# Patient Record
Sex: FEMALE | NOT HISPANIC OR LATINO | URBAN - METROPOLITAN AREA
[De-identification: names, ages, dates, MRNs, and addresses within clinical notes are randomized per-mention and may not be internally consistent; named-entity substitution may affect disease eponyms.]

---

## 2017-01-16 ENCOUNTER — IMPORTED ENCOUNTER (OUTPATIENT)
Dept: URBAN - METROPOLITAN AREA CLINIC 1 | Facility: CLINIC | Age: 82
End: 2017-01-16

## 2017-01-16 PROBLEM — Z96.1: Noted: 2017-01-16

## 2017-01-16 PROBLEM — H53.2: Noted: 2017-01-16

## 2017-01-16 PROBLEM — H04.123: Noted: 2017-01-16

## 2017-01-16 PROBLEM — H40.023: Noted: 2017-01-16

## 2017-01-16 PROBLEM — H16.143: Noted: 2017-01-16

## 2017-01-16 PROBLEM — H18.50: Noted: 2017-01-16

## 2017-01-16 PROBLEM — H17.13: Noted: 2017-01-16

## 2017-01-16 PROCEDURE — 92012 INTRM OPH EXAM EST PATIENT: CPT

## 2017-04-25 ENCOUNTER — IMPORTED ENCOUNTER (OUTPATIENT)
Dept: URBAN - METROPOLITAN AREA CLINIC 1 | Facility: CLINIC | Age: 82
End: 2017-04-25

## 2017-04-25 PROBLEM — H18.50: Noted: 2017-04-25

## 2017-04-25 PROBLEM — H04.123: Noted: 2017-04-25

## 2017-04-25 PROBLEM — H16.143: Noted: 2017-04-25

## 2017-04-25 PROBLEM — H40.023: Noted: 2017-04-25

## 2017-04-25 PROBLEM — Z96.1: Noted: 2017-04-25

## 2017-04-25 PROBLEM — H43.813: Noted: 2017-04-25

## 2017-04-25 PROCEDURE — 92014 COMPRE OPH EXAM EST PT 1/>: CPT

## 2017-04-25 NOTE — PATIENT DISCUSSION
1.  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 Continue Suzanne 128 gtts OU QHS. Reassurance given. 2.  LAILA w/ PEK OU- Continue ATs Q2hrs OU w/a. 3.  Glaucoma Suspect OU (0.8 OU/ () FHX): Stable IOP OU. Past w/u normal OU. Patient is considered high risk. 4.  Pseudophakia OU- (Standard OU) 5. PVD OU- stable RD precautions Letter to [address] for an appointment in 6 mo 10 OCT with Dr. Mariza Friedman.

## 2017-08-01 ENCOUNTER — IMPORTED ENCOUNTER (OUTPATIENT)
Dept: URBAN - METROPOLITAN AREA CLINIC 1 | Facility: CLINIC | Age: 82
End: 2017-08-01

## 2017-08-01 PROBLEM — Z96.1: Noted: 2017-08-01

## 2017-08-01 PROBLEM — H04.123: Noted: 2017-08-01

## 2017-08-01 PROBLEM — H18.50: Noted: 2017-08-01

## 2017-08-01 PROBLEM — H40.023: Noted: 2017-08-01

## 2017-08-01 PROBLEM — H16.143: Noted: 2017-08-01

## 2017-08-01 PROCEDURE — 92012 INTRM OPH EXAM EST PATIENT: CPT

## 2017-08-01 NOTE — PATIENT DISCUSSION
1.  Eczematous Dermatitis UL and LL OU - Begin Maxitrol alexandra QHS UL and LL x 5 days then DC and may use as needed 2. Corneal Dystrophy OU- w/ secondary Corneal Scars OU. observe3. LAILA w/ PEK OU-The use/continuation of artificial tears were recommended. 4.  Pseudophakia OU - h/o YAG Cap OU 5.  Glaucoma Suspect OU 0.8 OU/ () FHX): Patient is considered high risk. Condition was discussed with patient and patient understands. Will continue to monitor patient for any progression in condition. Patient was advised to call us with any problems questions or concerns. 6.  H/o PVD OUReturn for an appointment for Return as scheduled with Dr. Griselda Giron.

## 2017-10-23 ENCOUNTER — IMPORTED ENCOUNTER (OUTPATIENT)
Dept: URBAN - METROPOLITAN AREA CLINIC 1 | Facility: CLINIC | Age: 82
End: 2017-10-23

## 2017-10-23 PROBLEM — Z96.1: Noted: 2017-10-23

## 2017-10-23 PROBLEM — H16.143: Noted: 2017-10-23

## 2017-10-23 PROBLEM — H04.123: Noted: 2017-10-23

## 2017-10-23 PROBLEM — H18.50: Noted: 2017-10-23

## 2017-10-23 PROBLEM — H40.013: Noted: 2017-10-23

## 2017-10-23 PROCEDURE — 92012 INTRM OPH EXAM EST PATIENT: CPT

## 2017-10-23 PROCEDURE — 92133 CPTRZD OPH DX IMG PST SGM ON: CPT

## 2017-10-23 NOTE — PATIENT DISCUSSION
1.  LAILA w/ PEK OU- Stable. The continuation of artificial tears were recommended. 2.  Glaucoma Suspect OU (0.8 OU/ () FHX): Stable IOP OU. Normal OCT OU. Patient is considered Low Risk. Condition was discussed with patient and patient understands. Will continue to monitor patient for any progression in condition. Patient was advised to call us with any problems questions or concerns. 3.  Corneal Dystrophy OU w/ corneal scars OU- Stable. Observe4. Pseudophakia OU- Doing well.5. H/o Eczematous Dermatitis UL and LL OU- Controlled on sporadic use of Maxitrol JAMIE. 6.  Return for an appointment for a 27 in 1 year with Dr. Griselda Giron.

## 2018-10-23 ENCOUNTER — IMPORTED ENCOUNTER (OUTPATIENT)
Dept: URBAN - METROPOLITAN AREA CLINIC 1 | Facility: CLINIC | Age: 83
End: 2018-10-23

## 2018-10-23 PROBLEM — H16.143: Noted: 2018-10-23

## 2018-10-23 PROBLEM — H01.001: Noted: 2018-10-23

## 2018-10-23 PROBLEM — H18.50: Noted: 2018-10-23

## 2018-10-23 PROBLEM — H43.813: Noted: 2018-10-23

## 2018-10-23 PROBLEM — H40.023: Noted: 2018-10-23

## 2018-10-23 PROBLEM — H04.123: Noted: 2018-10-23

## 2018-10-23 PROBLEM — H01.004: Noted: 2018-10-23

## 2018-10-23 PROCEDURE — 92014 COMPRE OPH EXAM EST PT 1/>: CPT

## 2018-10-23 NOTE — PATIENT DISCUSSION
1.  LAILA w/ PEK OU- Cont ATs QID OU Routinely 2. Anterior Blepharitis OU - Begin Daily Hot compresses and lid scrubs were recommended. 3. [place] Corneal Dystrophy OU- observe4. PVD OU - RD precautions. 5.  Glaucoma Suspect OU (0.8 OU/ () FHX) IOP stable on no gtts. Past w/u neg. Cont to observe. 6.  H/o Eczematous Dermatitis UL and LL OU- Controlled on sporadic use of Maxitrol JAMIE. Letter to PCP Return for an appointment in 1 yr 27 with Dr. Brandy Lawson.

## 2019-05-28 NOTE — PATIENT DISCUSSION
Eylea 2mg injection recommended today after discussion of benefits, risks, alternatives. The injection was administered without complication. Post-injection instructions were reviewed and understood by the patient.

## 2019-06-25 NOTE — PATIENT DISCUSSION
Decreased SRF present on today's exam, Decision to treat was made based on today's clinical findings. .  Recommend injection today, follow up in Q5W.

## 2019-10-22 ENCOUNTER — IMPORTED ENCOUNTER (OUTPATIENT)
Dept: URBAN - METROPOLITAN AREA CLINIC 1 | Facility: CLINIC | Age: 84
End: 2019-10-22

## 2019-10-22 PROBLEM — H16.143: Noted: 2019-10-22

## 2019-10-22 PROBLEM — H01.001: Noted: 2019-10-22

## 2019-10-22 PROBLEM — H01.004: Noted: 2019-10-22

## 2019-10-22 PROBLEM — H04.123: Noted: 2019-10-22

## 2019-10-22 PROCEDURE — 92014 COMPRE OPH EXAM EST PT 1/>: CPT

## 2019-10-22 NOTE — PATIENT DISCUSSION
1.  LAILA w/ PEK OU- Switch to PF ATs and increase to Q2hrs OU w/a Routinely (Sample of PF Soothe XP given) Consider Restasis/Xiidra w/o improvement. 2.  Anterior Blepharitis OU - Cont Daily Hot compresses and lid scrubs were recommended. 3. [place] Corneal Dystrophy OU- observe4. PVD OU - RD precautions. 5.  Glaucoma Suspect OU (0.8 OU/ () FHX) IOP stable on no gtts. Past w/u neg. Cont to observe off gtts. 6.  H/o Eczematous Dermatitis UL and LL OU- No longer using Maxitrol. Letter to PCP MRx deferredReturn for an appointment in 3 months 10 with Dr. Summer Majano.

## 2020-01-28 ENCOUNTER — IMPORTED ENCOUNTER (OUTPATIENT)
Dept: URBAN - METROPOLITAN AREA CLINIC 1 | Facility: CLINIC | Age: 85
End: 2020-01-28

## 2020-01-28 PROBLEM — H16.143: Noted: 2020-01-28

## 2020-01-28 PROBLEM — H04.123: Noted: 2020-01-28

## 2020-01-28 PROCEDURE — 99213 OFFICE O/P EST LOW 20 MIN: CPT

## 2020-01-28 PROCEDURE — 92015 DETERMINE REFRACTIVE STATE: CPT

## 2020-01-28 NOTE — PATIENT DISCUSSION
Anterior Blepharitis OU - Cont Daily Hot compresses and lid scrubs were recommended. 3. [place] Corneal Dystrophy OU- observe4. Glaucoma Suspect OU (0.8 OU/ () FHX) IOP stable on no gtts. Past w/u neg. Cont to observe off gtts. 5.  H/o PVD OU6. H/o Eczematous Dermatitis UL and LL OU- No longer using Maxitrol.

## 2020-01-28 NOTE — PATIENT DISCUSSION
1.  LAILA w/ improved PEK OU- Recommend PF ATs QID-Q2H OU routinely. Consider Restasis/Xiidra without improvement2. Anterior Blepharitis OU - Cont Daily Hot compresses and lid scrubs were recommended. 3. [place] Corneal Dystrophy OU- observe4. Pseudophakia OU - H/o YAG Cap OU 5.  Glaucoma Suspect OU (0.8 OU/ () FHX) IOP stable on no gtts. Past w/u neg. Cont to observe off gtts. 6.  H/o PVD OU7. H/o Eczematous Dermatitis UL and LL OU- No longer using Maxitrol. MRX for glasses given. Return for an appointment in September 30 with Dr. Brittany Whittington.

## 2020-09-14 ENCOUNTER — IMPORTED ENCOUNTER (OUTPATIENT)
Dept: URBAN - METROPOLITAN AREA CLINIC 1 | Facility: CLINIC | Age: 85
End: 2020-09-14

## 2020-09-14 PROBLEM — H01.004: Noted: 2020-09-14

## 2020-09-14 PROBLEM — H04.123: Noted: 2020-09-14

## 2020-09-14 PROBLEM — H16.143: Noted: 2020-09-14

## 2020-09-14 PROBLEM — H01.001: Noted: 2020-09-14

## 2020-09-14 PROBLEM — Z96.1: Noted: 2020-09-14

## 2020-09-14 PROCEDURE — 92014 COMPRE OPH EXAM EST PT 1/>: CPT

## 2020-09-14 NOTE — PATIENT DISCUSSION
1.  LAILA w/ PEK OU- Recommend ATs QID OU routinely 2. Pseudophakia OU - H/o YAG Cap OU3. Anterior Blepharitis OU - Daily Hot compresses and lid scrubs were recommended. 4. [place] Corneal Dystrophy OU- observe5. Glaucoma Suspect OU (0.8 OU) - IOP stable on no gtts. Past w/u neg. Family hx. Cont to observe off gtts. 6.  PVD OU - RD precautions 7. H/o Eczematous Dermatitis UL and LL OU- No longer using Maxitrol. Return for an appointment in 6 months DFE/OCT with Dr. Austin Perez.

## 2021-03-16 ENCOUNTER — IMPORTED ENCOUNTER (OUTPATIENT)
Dept: URBAN - METROPOLITAN AREA CLINIC 1 | Facility: CLINIC | Age: 86
End: 2021-03-16

## 2021-03-16 PROBLEM — H01.004: Noted: 2021-03-16

## 2021-03-16 PROBLEM — H43.813: Noted: 2021-03-16

## 2021-03-16 PROBLEM — H16.143: Noted: 2021-03-16

## 2021-03-16 PROBLEM — H18.50: Noted: 2021-03-16

## 2021-03-16 PROBLEM — H40.013: Noted: 2021-03-16

## 2021-03-16 PROBLEM — Z96.1: Noted: 2021-03-16

## 2021-03-16 PROBLEM — H04.123: Noted: 2021-03-16

## 2021-03-16 PROBLEM — H01.001: Noted: 2021-03-16

## 2021-03-16 PROCEDURE — 92133 CPTRZD OPH DX IMG PST SGM ON: CPT

## 2021-03-16 PROCEDURE — 99214 OFFICE O/P EST MOD 30 MIN: CPT

## 2021-03-16 NOTE — PATIENT DISCUSSION
1.  Glaucoma Suspect OU (CD 0.80 OU): OCT shows mild RNFL thinning OD WNL OS. IOP stable. Family hx. Patient is considered Low Risk. Condition was discussed with patient and patient understands. Will continue to monitor patient for any progression in condition. Patient was advised to call us with any problems questions or concerns. 2.  LAILA w/ increased PEK OU- No relief from ATs alone. Begin Restasis BID OU (erx). Recommend ATs TID OU routinely 3. Pseudophakia OU - H/o YAG Cap OU 4. Anterior Blepharitis OU - Daily Hot compresses and lid scrubs were recommended. 5. Corneal Dystrophy OU- observe6. PVD OU - RD precautions. 7.  H/o Eczematous Dermatitis UL and LL OU- No longer using Maxitrol. Return for an appointment in 3 months 10 with Dr. Sneha Varela.

## 2021-06-15 ENCOUNTER — IMPORTED ENCOUNTER (OUTPATIENT)
Dept: URBAN - METROPOLITAN AREA CLINIC 1 | Facility: CLINIC | Age: 86
End: 2021-06-15

## 2021-06-15 PROBLEM — H16.143: Noted: 2021-06-15

## 2021-06-15 PROBLEM — H04.123: Noted: 2021-06-15

## 2021-06-15 PROBLEM — H18.50: Noted: 2021-06-15

## 2021-06-15 PROCEDURE — 99213 OFFICE O/P EST LOW 20 MIN: CPT

## 2021-06-15 NOTE — PATIENT DISCUSSION
1.  LAILA w/ improved PEK OU- Continue Restasis BID OU and Recommend ATs QID OU routinely 2. Corneal Dystrophy OU- with secondary decrease in vision observe3. Glaucoma Suspect OU (CD 0.80 OU): IOP stable. Family hx. Patient is considered Low Risk. Condition was discussed with patient and patient understands. Will continue to monitor patient for any progression in condition. Patient was advised to call us with any problems questions or concerns. 4.  Pseudophakia OU - H/o YAG Cap OU 5. Anterior Blepharitis OU - Daily Hot compresses and lid scrubs were recommended. 6. H/o PVD OU  7. H/o Eczematous Dermatitis UL and LL OU- No longer using Maxitrol. Return for an appointment in 6 months 27 with Dr. Pamella Duron.

## 2021-12-14 ENCOUNTER — IMPORTED ENCOUNTER (OUTPATIENT)
Dept: URBAN - METROPOLITAN AREA CLINIC 1 | Facility: CLINIC | Age: 86
End: 2021-12-14

## 2021-12-14 PROBLEM — H16.143: Noted: 2021-12-14

## 2021-12-14 PROBLEM — H04.123: Noted: 2021-12-14

## 2021-12-14 PROBLEM — H40.013: Noted: 2021-12-14

## 2021-12-14 PROBLEM — H18.50: Noted: 2021-12-14

## 2021-12-14 PROCEDURE — 92015 DETERMINE REFRACTIVE STATE: CPT

## 2021-12-14 PROCEDURE — 99214 OFFICE O/P EST MOD 30 MIN: CPT

## 2021-12-14 NOTE — PATIENT DISCUSSION
1.  LAILA w/ improved PEK OU- Continue Restasis BID OU and Recommend ATs QID OU routinely 2. Corneal Dystrophy OU- observe3. Glaucoma Suspect OU (CD 0.80 OU): IOP stable. Family hx. Patient is considered Low Risk. Condition was discussed with patient and patient understands. Will continue to monitor patient for any progression in condition. Patient was advised to call us with any problems questions or concerns. 4.  Pseudophakia OU - H/o YAG Cap OU 5. Anterior Blepharitis OU - Daily Hot compresses and lid scrubs were recommended. 6. H/o PVD OU  7. H/o Eczematous Dermatitis UL and LL OU- No longer using Maxitrol. MRX for glasses given. Return for an appointment in 11 month 10 with Dr. Elma James.

## 2022-04-02 ASSESSMENT — KERATOMETRY
OS_AXISANGLE2_DEGREES: 149
OD_AXISANGLE2_DEGREES: 063
OS_K1POWER_DIOPTERS: 41.75
OS_AXISANGLE_DEGREES: 059
OD_K1POWER_DIOPTERS: 42.75
OD_K2POWER_DIOPTERS: 43.50
OD_AXISANGLE_DEGREES: 153
OS_K2POWER_DIOPTERS: 44.00

## 2022-04-02 ASSESSMENT — TONOMETRY
OD_IOP_MMHG: 12
OS_IOP_MMHG: 12
OD_IOP_MMHG: 12
OS_IOP_MMHG: 12
OD_IOP_MMHG: 13
OS_IOP_MMHG: 14
OD_IOP_MMHG: 12
OS_IOP_MMHG: 12
OS_IOP_MMHG: 12
OS_IOP_MMHG: 14
OD_IOP_MMHG: 12
OS_IOP_MMHG: 11
OD_IOP_MMHG: 14
OD_IOP_MMHG: 11
OS_IOP_MMHG: 12
OD_IOP_MMHG: 14
OS_IOP_MMHG: 13
OD_IOP_MMHG: 12
OS_IOP_MMHG: 12
OD_IOP_MMHG: 11

## 2022-04-02 ASSESSMENT — VISUAL ACUITY
OS_CC: J2
OS_SC: 20/40
OS_SC: 20/30-1
OD_SC: 20/30+1
OS_SC: 20/30
OS_CC: J2
OS_SC: 20/30
OS_SC: 20/40
OS_CC: 20/40
OD_CC: 20/40
OS_SC: 20/30
OD_CC: J2
OS_CC: J2
OD_SC: 20/20-1
OD_SC: 20/25+1
OS_SC: 20/40
OS_SC: 20/25
OD_CC: J2
OS_CC: J2
OD_SC: 20/30
OD_SC: 20/25-1
OD_SC: 20/30
OS_SC: 20/40
OS_CC: J2
OS_SC: 20/25-1
OD_SC: 20/30
OS_SC: 20/40+1
OD_SC: 20/25
OD_CC: J2
OD_SC: 20/20-1
OD_CC: J2
OD_CC: J2

## 2022-06-21 ENCOUNTER — FOLLOW UP (OUTPATIENT)
Dept: URBAN - METROPOLITAN AREA CLINIC 1 | Facility: CLINIC | Age: 87
End: 2022-06-21

## 2022-06-21 DIAGNOSIS — H01.131: ICD-10-CM

## 2022-06-21 DIAGNOSIS — H18.593: ICD-10-CM

## 2022-06-21 DIAGNOSIS — H01.134: ICD-10-CM

## 2022-06-21 DIAGNOSIS — H04.123: ICD-10-CM

## 2022-06-21 DIAGNOSIS — H16.143: ICD-10-CM

## 2022-06-21 DIAGNOSIS — H40.013: ICD-10-CM

## 2022-06-21 PROCEDURE — 92012 INTRM OPH EXAM EST PATIENT: CPT

## 2022-06-21 ASSESSMENT — VISUAL ACUITY
OD_CC: 20/25
OS_CC: 20/30

## 2022-06-21 ASSESSMENT — TONOMETRY
OD_IOP_MMHG: 13
OS_IOP_MMHG: 13

## 2022-06-21 NOTE — PATIENT DISCUSSION
(CD 0.80 OU): IOP stable. Family hx. Patient is considered Low Risk. Condition was discussed with patient and patient understands. Will continue to monitor patient for any progression in condition. Patient was advised to call us with any problems questions or concerns.

## 2022-06-21 NOTE — PATIENT DISCUSSION
PEK/Symptoms controlled well however given price of Restasis for patient- will have patient d/c Restasis. Begin Cequa BID OU (erx'd). Recommend ATs QID OU routinely.

## 2023-01-03 ENCOUNTER — COMPREHENSIVE EXAM (OUTPATIENT)
Dept: URBAN - METROPOLITAN AREA CLINIC 1 | Facility: CLINIC | Age: 88
End: 2023-01-03

## 2023-01-03 DIAGNOSIS — H40.013: ICD-10-CM

## 2023-01-03 DIAGNOSIS — H04.123: ICD-10-CM

## 2023-01-03 DIAGNOSIS — H16.143: ICD-10-CM

## 2023-01-03 PROCEDURE — 99214 OFFICE O/P EST MOD 30 MIN: CPT

## 2023-01-03 ASSESSMENT — VISUAL ACUITY
OD_CC: 20/30+2
OS_CC: 20/30-2
OU_CC: J2

## 2023-01-03 ASSESSMENT — TONOMETRY
OS_IOP_MMHG: 12
OD_IOP_MMHG: 12

## 2023-01-03 NOTE — PATIENT DISCUSSION
Slight increase of PEK today. Patient is using Restasis BID OU. Will continue. Recommend ATs QID OU routinely.

## 2023-05-11 ENCOUNTER — FOLLOW UP (OUTPATIENT)
Dept: URBAN - METROPOLITAN AREA CLINIC 1 | Facility: CLINIC | Age: 88
End: 2023-05-11

## 2023-05-11 DIAGNOSIS — Z96.1: ICD-10-CM

## 2023-05-11 DIAGNOSIS — H40.013: ICD-10-CM

## 2023-05-11 DIAGNOSIS — H04.123: ICD-10-CM

## 2023-05-11 PROCEDURE — 92133 CPTRZD OPH DX IMG PST SGM ON: CPT

## 2023-05-11 PROCEDURE — 99213 OFFICE O/P EST LOW 20 MIN: CPT

## 2023-05-11 PROCEDURE — 92015 DETERMINE REFRACTIVE STATE: CPT

## 2023-05-11 ASSESSMENT — VISUAL ACUITY
OD_CC: 20/30
OD_CC: J2
OS_CC: 20/30-1
OS_CC: J2

## 2023-05-11 ASSESSMENT — TONOMETRY
OD_IOP_MMHG: 11
OS_IOP_MMHG: 11

## 2023-11-20 ENCOUNTER — COMPREHENSIVE EXAM (OUTPATIENT)
Dept: URBAN - METROPOLITAN AREA CLINIC 1 | Facility: CLINIC | Age: 88
End: 2023-11-20

## 2023-11-20 DIAGNOSIS — Z96.1: ICD-10-CM

## 2023-11-20 DIAGNOSIS — H40.013: ICD-10-CM

## 2023-11-20 DIAGNOSIS — H04.123: ICD-10-CM

## 2023-11-20 PROCEDURE — 99214 OFFICE O/P EST MOD 30 MIN: CPT

## 2023-11-20 PROCEDURE — 92015 DETERMINE REFRACTIVE STATE: CPT

## 2023-11-20 ASSESSMENT — TONOMETRY
OS_IOP_MMHG: 11
OD_IOP_MMHG: 11

## 2024-05-24 ENCOUNTER — FOLLOW UP (OUTPATIENT)
Dept: URBAN - METROPOLITAN AREA CLINIC 1 | Facility: CLINIC | Age: 89
End: 2024-05-24

## 2024-05-24 DIAGNOSIS — H04.123: ICD-10-CM

## 2024-05-24 PROCEDURE — 99213 OFFICE O/P EST LOW 20 MIN: CPT

## 2024-05-24 ASSESSMENT — VISUAL ACUITY
OS_CC: 20/30
OD_CC: 20/25

## 2024-05-24 ASSESSMENT — TONOMETRY
OS_IOP_MMHG: 10
OD_IOP_MMHG: 10

## 2025-01-03 ENCOUNTER — COMPREHENSIVE EXAM (OUTPATIENT)
Age: OVER 89
End: 2025-01-03

## 2025-01-03 DIAGNOSIS — H01.131: ICD-10-CM

## 2025-01-03 DIAGNOSIS — H18.593: ICD-10-CM

## 2025-01-03 DIAGNOSIS — H16.143: ICD-10-CM

## 2025-01-03 DIAGNOSIS — H40.013: ICD-10-CM

## 2025-01-03 DIAGNOSIS — Z96.1: ICD-10-CM

## 2025-01-03 DIAGNOSIS — H01.134: ICD-10-CM

## 2025-01-03 DIAGNOSIS — H04.123: ICD-10-CM

## 2025-01-03 PROCEDURE — 99213 OFFICE O/P EST LOW 20 MIN: CPT

## 2025-05-30 ENCOUNTER — FOLLOW UP (OUTPATIENT)
Age: OVER 89
End: 2025-05-30

## 2025-05-30 DIAGNOSIS — H16.143: ICD-10-CM

## 2025-05-30 DIAGNOSIS — H04.123: ICD-10-CM

## 2025-05-30 DIAGNOSIS — H40.013: ICD-10-CM

## 2025-05-30 PROCEDURE — 99213 OFFICE O/P EST LOW 20 MIN: CPT

## 2025-05-30 PROCEDURE — 92133 CPTRZD OPH DX IMG PST SGM ON: CPT
